# Patient Record
Sex: FEMALE | Race: BLACK OR AFRICAN AMERICAN | NOT HISPANIC OR LATINO | URBAN - METROPOLITAN AREA
[De-identification: names, ages, dates, MRNs, and addresses within clinical notes are randomized per-mention and may not be internally consistent; named-entity substitution may affect disease eponyms.]

---

## 2023-04-14 ENCOUNTER — APPOINTMENT (RX ONLY)
Dept: URBAN - METROPOLITAN AREA CLINIC 142 | Facility: CLINIC | Age: 68
Setting detail: DERMATOLOGY
End: 2023-04-14

## 2023-04-14 DIAGNOSIS — L0390 CELLULITIS AND ABSCESS OF UNSPECIFIED SITES: ICD-10-CM | Status: INADEQUATELY CONTROLLED

## 2023-04-14 DIAGNOSIS — L0391 CELLULITIS AND ABSCESS OF UNSPECIFIED SITES: ICD-10-CM | Status: INADEQUATELY CONTROLLED

## 2023-04-14 PROBLEM — L02.412 CUTANEOUS ABSCESS OF LEFT AXILLA: Status: ACTIVE | Noted: 2023-04-14

## 2023-04-14 PROCEDURE — 10060 I&D ABSCESS SIMPLE/SINGLE: CPT

## 2023-04-14 PROCEDURE — ? COUNSELING

## 2023-04-14 PROCEDURE — 11900 INJECT SKIN LESIONS </W 7: CPT

## 2023-04-14 PROCEDURE — ? INCISION AND DRAINAGE

## 2023-04-14 PROCEDURE — ? INTRALESIONAL KENALOG

## 2023-04-14 PROCEDURE — ? PRESCRIPTION MEDICATION MANAGEMENT

## 2023-04-14 PROCEDURE — ? SEPARATE AND IDENTIFIABLE DOCUMENTATION

## 2023-04-14 PROCEDURE — 99212 OFFICE O/P EST SF 10 MIN: CPT | Mod: 25

## 2023-04-14 PROCEDURE — ? PRESCRIPTION

## 2023-04-14 RX ORDER — DOXYCYCLINE HYCLATE 100 MG/1
CAPSULE, GELATIN COATED ORAL
Qty: 14 | Refills: 0 | Status: CANCELLED
Stop reason: CLARIF

## 2023-04-14 RX ORDER — CLINDAMYCIN PHOSPHATE 10 MG/ML
SOLUTION TOPICAL
Qty: 60 | Refills: 1 | Status: ERX | COMMUNITY
Start: 2023-04-14

## 2023-04-14 RX ADMIN — CLINDAMYCIN PHOSPHATE: 10 SOLUTION TOPICAL at 00:00

## 2023-04-14 ASSESSMENT — LOCATION SIMPLE DESCRIPTION DERM: LOCATION SIMPLE: LEFT AXILLARY VAULT

## 2023-04-14 ASSESSMENT — LOCATION DETAILED DESCRIPTION DERM: LOCATION DETAILED: LEFT AXILLARY VAULT

## 2023-04-14 ASSESSMENT — LOCATION ZONE DERM: LOCATION ZONE: AXILLAE

## 2023-04-14 NOTE — PROCEDURE: PRESCRIPTION MEDICATION MANAGEMENT
Render In Strict Bullet Format?: No
Detail Level: Zone
Initiate Treatment: Clindamycin topical treatment as directed.

## 2023-04-14 NOTE — PROCEDURE: INTRALESIONAL KENALOG
How Many Mls Were Removed From The 40 Mg/Ml (5ml) Vial When Preparing The Injectable Solution?: 0
Lot # (Optional): [id number]
Kenalog Preparation: Kenalog
Medical Necessity Clause: This procedure was medically necessary because the lesions that were treated were:
Ndc# For Kenalog Only: 8534-9485-08
Administered By (Optional): Sandra Taveras
Detail Level: Detailed
Show Inventory Tab: Hide
Expiration Date (Optional): 2/2024
Total Volume Injected (Ccs- Only Use Numbers And Decimals): 0.4 ccs
Consent: The risks of atrophy were reviewed with the patient.
Concentration Of Solution Injected (Mg/Ml): 5.0
Bill For Wasted Drug?: no
Validate Note Data When Using Inventory: Yes

## 2023-04-14 NOTE — PROCEDURE: INCISION AND DRAINAGE
Detail Level: Detailed
Lesion Type: Abscess
Method: 15 blade
Curette: No
Anesthesia Type: 1% lidocaine with epinephrine
Anesthesia Volume In Cc: 1
Size Of Lesion In Cm (Optional But May Be Required For Some Insurances): 0
Drainage Amount?: moderate
Drainage Type?: bloody and cyst-like
Wound Care: Petrolatum
Dressing: dry sterile dressing
Epidermal Sutures: 4-0 Ethilon
Epidermal Closure: simple interrupted
Suture Text: The incision was partially closed with
Preparation Text: The area was prepped in the usual clean fashion.
Curette Text (Optional): After the contents were expressed a curette was used to partially remove the cyst wall.
Consent was obtained and risks were reviewed including but not limited to delayed wound healing, infection, need for multiple I and D's, and pain.
Post-Care Instructions: I reviewed with the patient in detail post-care instructions. Patient should keep wound covered and call the office should any redness, pain, swelling or worsening occur.